# Patient Record
Sex: FEMALE | Race: WHITE | Employment: UNEMPLOYED | URBAN - METROPOLITAN AREA
[De-identification: names, ages, dates, MRNs, and addresses within clinical notes are randomized per-mention and may not be internally consistent; named-entity substitution may affect disease eponyms.]

---

## 2017-11-19 ENCOUNTER — APPOINTMENT (OUTPATIENT)
Dept: GENERAL RADIOLOGY | Age: 62
End: 2017-11-19
Attending: EMERGENCY MEDICINE
Payer: OTHER GOVERNMENT

## 2017-11-19 ENCOUNTER — HOSPITAL ENCOUNTER (EMERGENCY)
Age: 62
Discharge: HOME | End: 2017-11-19
Attending: EMERGENCY MEDICINE
Payer: OTHER GOVERNMENT

## 2017-11-19 VITALS
HEIGHT: 63 IN | OXYGEN SATURATION: 97 % | HEART RATE: 80 BPM | TEMPERATURE: 98.5 F | BODY MASS INDEX: 20.91 KG/M2 | RESPIRATION RATE: 16 BRPM | SYSTOLIC BLOOD PRESSURE: 127 MMHG | DIASTOLIC BLOOD PRESSURE: 84 MMHG | WEIGHT: 118 LBS

## 2017-11-19 DIAGNOSIS — R09.81 NASAL CONGESTION: ICD-10-CM

## 2017-11-19 DIAGNOSIS — R05.9 COUGH: ICD-10-CM

## 2017-11-19 DIAGNOSIS — J20.9 ACUTE BRONCHITIS, UNSPECIFIED ORGANISM: Primary | ICD-10-CM

## 2017-11-19 PROCEDURE — 71020 XR CHEST PA LAT: CPT

## 2017-11-19 PROCEDURE — 74011000250 HC RX REV CODE- 250: Performed by: NURSE PRACTITIONER

## 2017-11-19 PROCEDURE — 94640 AIRWAY INHALATION TREATMENT: CPT

## 2017-11-19 PROCEDURE — 77030029684 HC NEB SM VOL KT MONA -A

## 2017-11-19 PROCEDURE — 74011636637 HC RX REV CODE- 636/637: Performed by: NURSE PRACTITIONER

## 2017-11-19 PROCEDURE — 99282 EMERGENCY DEPT VISIT SF MDM: CPT

## 2017-11-19 RX ORDER — AZITHROMYCIN 250 MG/1
TABLET, FILM COATED ORAL
Qty: 6 TAB | Refills: 0 | Status: SHIPPED | OUTPATIENT
Start: 2017-11-19 | End: 2017-11-24

## 2017-11-19 RX ORDER — ALBUTEROL SULFATE 2.5 MG/.5ML
2.5 SOLUTION RESPIRATORY (INHALATION)
Status: COMPLETED | OUTPATIENT
Start: 2017-11-19 | End: 2017-11-19

## 2017-11-19 RX ORDER — PREDNISONE 20 MG/1
TABLET ORAL
Qty: 5 TAB | Refills: 0 | Status: SHIPPED | OUTPATIENT
Start: 2017-11-19

## 2017-11-19 RX ORDER — PREDNISONE 20 MG/1
40 TABLET ORAL
Status: DISCONTINUED | OUTPATIENT
Start: 2017-11-19 | End: 2017-11-19 | Stop reason: HOSPADM

## 2017-11-19 RX ORDER — PREDNISONE 20 MG/1
40 TABLET ORAL
Status: DISCONTINUED | OUTPATIENT
Start: 2017-11-20 | End: 2017-11-19

## 2017-11-19 RX ORDER — MORPHINE SULFATE 30 MG/1
TABLET, FILM COATED, EXTENDED RELEASE ORAL EVERY 12 HOURS
COMMUNITY

## 2017-11-19 RX ADMIN — ALBUTEROL SULFATE 2.5 MG: 2.5 SOLUTION RESPIRATORY (INHALATION) at 12:53

## 2017-11-19 RX ADMIN — PREDNISONE 40 MG: 20 TABLET ORAL at 13:59

## 2017-11-19 NOTE — ED PROVIDER NOTES
[address]  EMERGENCY DEPARTMENT HISTORY AND PHYSICAL EXAM       Date: 2017   Patient Name: Roney Naqvi   YOB: 1955  Medical Record Number: [id number]    History of Presenting Illness     Chief Complaint   Patient presents with    Cough        History Provided By:  patient    Additional History: 12:13 PM  Roney Naqvi is a 65 y.o. female who presents to the emergency department C/O cough onset 1 week ago. Associated symptoms include headache, congestion, resolved fever, ear itching, post nasal drip and chills. Patient is concerned for bronchitis; family member was dx with bronchitis. Patient took 1/2 Prednisone; reports she stop taking the medication a few days ago because it made her face red. Pshx of port-a-cath. Shx tobacco usage. Medication include inhaler. Pt denies ear pain, sore throat and any other Sx or complaints. Primary Care Provider: Sabrina Chan DO   Specialist:    Past History     Past Medical History:   Past Medical History:   Diagnosis Date    Port-a-cath in place     RSD (reflex sympathetic dystrophy)     Solitary kidney         Past Surgical History:   Past Surgical History:   Procedure Laterality Date    HX  SECTION          Family History:   History reviewed. No pertinent family history. Social History:   Social History   Substance Use Topics    Smoking status: Current Every Day Smoker     Packs/day: 0.50    Smokeless tobacco: Never Used    Alcohol use No        Allergies:   No Known Allergies     Review of Systems   Review of Systems   Constitutional: Positive for chills and fever (resolved). HENT: Positive for congestion and postnasal drip. Negative for ear pain and sore throat. (+) ear itching     Respiratory: Positive for cough. Neurological: Positive for headaches. All other systems reviewed and are negative.       Physical Exam  Vitals:    17 1132 17 1256 17 1403   BP: 131/74 127/84   Pulse: 86  80   Resp: 20  16   Temp: 98.5 °F (36.9 °C)     SpO2: 100% 100% 97%   Weight: 53.5 kg (118 lb)     Height: 5' 3\" (1.6 m)         Physical Exam   Constitutional: She is oriented to person, place, and time. She appears well-developed and well-nourished. HENT:   Head: Normocephalic. Right Ear: Hearing and tympanic membrane normal.   Left Ear: Hearing and tympanic membrane normal.   Nose: Rhinorrhea present. Mouth/Throat: Mucous membranes are normal.       Eyes: Conjunctivae are normal.   Neck: Normal range of motion. Cardiovascular: Normal rate, regular rhythm and normal heart sounds. No murmur heard. Pulmonary/Chest: Effort normal.   Inspiratory wheezing noted left upper lobe   Abdominal: Soft. Bowel sounds are normal. There is no tenderness. Musculoskeletal: Normal range of motion. She exhibits no edema. Neurological: She is alert and oriented to person, place, and time. Skin: Skin is warm and dry. Will give patient a breathing treatment. Patient given first dose of prednisone. Will obtain xray to evaluate for any pneumonia. Will give patient Precious Barros as she endorses tobacco products. Diagnostic Study Results     Labs -    No results found for this or any previous visit (from the past 12 hour(s)). Radiologic Studies -   The following have been ordered and reviewed:  XR CHEST PA LAT   Final Result  IMPRESSION:   1. No acute radiographic cardiopulmonary normality. 2. Abnormal position of the right chest wall port catheter, suggesting  retraction of the catheter tubing. Comparison prior prior imaging of the chest,  to include  postplacement radiographs available. As read by the radiologist.    -- Mediport position discussed with Carmen Dick ED staff at 12:10 PM on  partial today. Medical Decision Making   I am the first provider for this patient.      I reviewed the vital signs, available nursing notes, past medical history, past surgical history, family history and social history. Vital Signs-Reviewed the patient's vital signs. Patient Vitals for the past 12 hrs:   Temp Pulse Resp BP SpO2   11/19/17 1403 - 80 16 127/84 97 %   11/19/17 1256 - - - - 100 %   11/19/17 1132 98.5 °F (36.9 °C) 86 20 131/74 100 %       Pulse Oximetry Analysis - Normal 100% on RA       Old Medical Records: Old medical records. Nursing notes. Procedures:   Procedures    ED Course:  12:13 PM  Initial assessment performed. The patients presenting problems have been discussed, and they are in agreement with the care plan formulated and outlined with them. I have encouraged them to ask questions as they arise throughout their visit. 12:15: Extensive conversation with patient regarding placement of port. Patient has been told the placement is incorrect but the port was placed over 20 years ago and is no longer in use. She was told they are unable to remove but will discuss with her PCP. Patient understands reasons to return to the ED and is offering no questions or concerns at this time. Patient already has albuterol inhaler. Medications Given in the ED:  Medications   predniSONE (DELTASONE) tablet 40 mg (40 mg Oral Given 11/19/17 8749)   albuterol CONCENTRATE 2.5mg/0.5 mL neb soln (2.5 mg Nebulization Given 11/19/17 1253)       Discharge Note:  1:00 PM   Pt has been reexamined. Patient has no new complaints, changes, or physical findings. Care plan outlined and precautions discussed. Results were reviewed with the patient. All medications were reviewed with the patient; will d/c home with Deltasone and Zithromax. All of pt's questions and concerns were addressed. Patient was instructed and agrees to follow up with PCP, as well as to return to the ED upon further deterioration. Patient is ready to go home. Diagnosis   Clinical Impression:   1. Acute bronchitis, unspecified organism    2. Cough    3.  Nasal congestion           Follow-up Information     Follow up With Details Comments Contact Info    Duarte Castro,  Schedule an appointment as soon as possible for a visit For primary care follow up [address] [place] [address]      [facility] EM[facility] DE[facility] Go to As needed, as symptoms worsen [address] [id number]  [phone]          Discharge Medication List as of 11/19/2017 12:48 PM      START taking these medications    Details   predniSONE (DELTASONE) 20 mg tablet Take 40 mg tomorrow, Take 20 mg daily for 3 days, Print, Disp-5 Tab, R-0      azithromycin (ZITHROMAX) 250 mg tablet Take as directed, Print, Disp-6 Tab, R-0         CONTINUE these medications which have NOT CHANGED    Details   morphine CR (MS CONTIN) 30 mg CR tablet Take  by mouth every twelve (12) hours. , Historical Med             _______________________________   Attestations: This note is prepared by Dragan Miller, acting as a Scribe for Rehan Kinsey NP on 12:13 PM on 11/19/2017 . Rehan Kinsey NP: The scribe's documentation has been prepared under my direction and personally reviewed by me in its entirety.   _______________________________

## 2017-11-19 NOTE — DISCHARGE INSTRUCTIONS
Cough: Care Instructions  Your Care Instructions    A cough is your body's response to something that bothers your throat or airways. Many things can cause a cough. You might cough because of a cold or the flu, bronchitis, or asthma. Smoking, postnasal drip, allergies, and stomach acid that backs up into your throat also can cause coughs. A cough is a symptom, not a disease. Most coughs stop when the cause, such as a cold, goes away. You can take a few steps at home to cough less and feel better. Follow-up care is a key part of your treatment and safety. Be sure to make and go to all appointments, and call your doctor if you are having problems. It's also a good idea to know your test results and keep a list of the medicines you take. How can you care for yourself at home? · Drink lots of water and other fluids. This helps thin the mucus and soothes a dry or sore throat. Honey or lemon juice in hot water or tea may ease a dry cough. · Take cough medicine as directed by your doctor. · Prop up your head on pillows to help you breathe and ease a dry cough. · Try cough drops to soothe a dry or sore throat. Cough drops don't stop a cough. Medicine-flavored cough drops are no better than candy-flavored drops or hard candy. · Do not smoke. Avoid secondhand smoke. If you need help quitting, talk to your doctor about stop-smoking programs and medicines. These can increase your chances of quitting for good. When should you call for help? Call 911 anytime you think you may need emergency care. For example, call if:  ? · You have severe trouble breathing. ?Call your doctor now or seek immediate medical care if:  ? · You cough up blood. ? · You have new or worse trouble breathing. ? · You have a new or higher fever. ? · You have a new rash. ? Watch closely for changes in your health, and be sure to contact your doctor if:  ? · You cough more deeply or more often, especially if you notice more mucus or a change in the color of your mucus. ? · You have new symptoms, such as a sore throat, an earache, or sinus pain. ? · You do not get better as expected. Where can you learn more? Go to http://griffin-cary.info/. Enter D279 in the search box to learn more about \"Cough: Care Instructions. \"  Current as of: May 12, 2017  Content Version: 11.4  © 3914-5746 [employer]. Care instructions adapted under license by [employer] (which disclaims liability or warranty for this information). If you have questions about a medical condition or this instruction, always ask your healthcare professional. [place] [address] any warranty or liability for your use of this information. Follow up as directed  Take medications as prescribed  Return to the Ed for fever, shaking, chills, difficulty breathing, increased SOB, chest pain, or worsening of symptoms        Bronchitis: Care Instructions  Your Care Instructions    Bronchitis is inflammation of the bronchial tubes, which carry air to the lungs. The tubes swell and produce mucus, or phlegm. The mucus and inflamed bronchial tubes make you cough. You may have trouble breathing. Most cases of bronchitis are caused by viruses like those that cause colds. Antibiotics usually do not help and they may be harmful. Bronchitis usually develops rapidly and lasts about 2 to 3 weeks in otherwise healthy people. Follow-up care is a key part of your treatment and safety. Be sure to make and go to all appointments, and call your doctor if you are having problems. It's also a good idea to know your test results and keep a list of the medicines you take. How can you care for yourself at home? · Take all medicines exactly as prescribed. Call your doctor if you think you are having a problem with your medicine.   · Get some extra rest.  · Take an over-the-counter pain medicine, such as acetaminophen (Tylenol), ibuprofen (Advil, Motrin), or naproxen (Aleve) to reduce fever and relieve body aches. Read and follow all instructions on the label. · Do not take two or more pain medicines at the same time unless the doctor told you to. Many pain medicines have acetaminophen, which is Tylenol. Too much acetaminophen (Tylenol) can be harmful. · Take an over-the-counter cough medicine that contains dextromethorphan to help quiet a dry, hacking cough so that you can sleep. Avoid cough medicines that have more than one active ingredient. Read and follow all instructions on the label. · Breathe moist air from a humidifier, hot shower, or sink filled with hot water. The heat and moisture will thin mucus so you can cough it out. · Do not smoke. Smoking can make bronchitis worse. If you need help quitting, talk to your doctor about stop-smoking programs and medicines. These can increase your chances of quitting for good. When should you call for help? Call 911 anytime you think you may need emergency care. For example, call if:  ? · You have severe trouble breathing. ?Call your doctor now or seek immediate medical care if:  ? · You have new or worse trouble breathing. ? · You cough up dark brown or bloody mucus (sputum). ? · You have a new or higher fever. ? · You have a new rash. ? Watch closely for changes in your health, and be sure to contact your doctor if:  ? · You cough more deeply or more often, especially if you notice more mucus or a change in the color of your mucus. ? · You are not getting better as expected. Where can you learn more? Go to http://griffin-cary.info/. Enter H333 in the search box to learn more about \"Bronchitis: Care Instructions. \"  Current as of: May 12, 2017  Content Version: 11.4  © 9780-0531 "[employer] Care instructions adapted under license by [employer] (which disclaims liability or warranty for this information).  If you have questions about a medical condition or this instruction, always ask your healthcare professional. [place] [address] any warranty or liability for your use of this information.